# Patient Record
Sex: MALE | Race: OTHER | NOT HISPANIC OR LATINO | Employment: FULL TIME | URBAN - METROPOLITAN AREA
[De-identification: names, ages, dates, MRNs, and addresses within clinical notes are randomized per-mention and may not be internally consistent; named-entity substitution may affect disease eponyms.]

---

## 2023-06-23 ENCOUNTER — OFFICE VISIT (OUTPATIENT)
Dept: UROLOGY | Facility: CLINIC | Age: 48
End: 2023-06-23
Payer: MEDICAID

## 2023-06-23 DIAGNOSIS — R31.0 GROSS HEMATURIA: Primary | ICD-10-CM

## 2023-06-23 DIAGNOSIS — R82.90 ABNORMAL URINALYSIS: ICD-10-CM

## 2023-06-23 LAB
BILIRUBIN, UA POC OHS: NEGATIVE
BLOOD, UA POC OHS: ABNORMAL
CLARITY, UA POC OHS: CLEAR
COLOR, UA POC OHS: YELLOW
GLUCOSE, UA POC OHS: NEGATIVE
KETONES, UA POC OHS: NEGATIVE
LEUKOCYTES, UA POC OHS: ABNORMAL
NITRITE, UA POC OHS: NEGATIVE
PH, UA POC OHS: 6
PROTEIN, UA POC OHS: NEGATIVE
SPECIFIC GRAVITY, UA POC OHS: >=1.03
UROBILINOGEN, UA POC OHS: 0.2

## 2023-06-23 PROCEDURE — 81003 URINALYSIS AUTO W/O SCOPE: CPT | Mod: QW,S$GLB,, | Performed by: UROLOGY

## 2023-06-23 PROCEDURE — 99204 PR OFFICE/OUTPT VISIT, NEW, LEVL IV, 45-59 MIN: ICD-10-PCS | Mod: S$GLB,,, | Performed by: UROLOGY

## 2023-06-23 PROCEDURE — 81003 POCT URINALYSIS(INSTRUMENT): ICD-10-PCS | Mod: QW,S$GLB,, | Performed by: UROLOGY

## 2023-06-23 PROCEDURE — 99204 OFFICE O/P NEW MOD 45 MIN: CPT | Mod: S$GLB,,, | Performed by: UROLOGY

## 2023-06-23 RX ORDER — CEFDINIR 300 MG/1
300 CAPSULE ORAL 2 TIMES DAILY
Qty: 14 CAPSULE | Refills: 0 | Status: SHIPPED | OUTPATIENT
Start: 2023-06-23 | End: 2023-06-30

## 2023-06-23 NOTE — PROGRESS NOTES
Subjective:       Patient ID: Eduardo Jesus is a 47 y.o. male.    Chief Complaint: Testicle Pain and Hematuria      HPI: 47-year-old male patient presenting to the clinic to establish care following a recent hospital visit for testicular pain and hematuria.  Patient no longer complaining of pain in the testicles.  He had a CT done on 06/19/2023 with no abnormal findings.  Patient complains of gross hematuria that began about 2 weeks ago.  Patient is a smoker.  At his ER visit physician also mentioned an elevated PSA but the patient is unsure about the actual number.  He does have a family history of prostate cancer.  He has no other complaint at this time       Past Medical History: History reviewed. No pertinent past medical history.    Past Surgical Historical:   Past Surgical History:   Procedure Laterality Date    left should surgery          Medications:   Medication List with Changes/Refills   New Medications    CEFDINIR (OMNICEF) 300 MG CAPSULE    Take 1 capsule (300 mg total) by mouth 2 (two) times daily. for 7 days        Past Social History:   Social History     Socioeconomic History    Marital status:    Tobacco Use    Smoking status: Some Days     Types: Cigarettes    Smokeless tobacco: Never   Substance and Sexual Activity    Alcohol use: Never    Drug use: Never    Sexual activity: Yes       Allergies: Review of patient's allergies indicates:  No Known Allergies     Family History: History reviewed. No pertinent family history.     Review of Systems:  Review of Systems   HENT: Negative.     Respiratory: Negative.       Physical Exam:  Physical Exam  Constitutional:       Appearance: He is normal weight.   HENT:      Head: Normocephalic.      Nose: Nose normal.      Mouth/Throat:      Mouth: Mucous membranes are moist.      Pharynx: Oropharynx is clear.   Eyes:      Extraocular Movements: Extraocular movements intact.      Conjunctiva/sclera: Conjunctivae normal.      Pupils: Pupils are equal,  round, and reactive to light.   Cardiovascular:      Rate and Rhythm: Regular rhythm.      Pulses: Normal pulses.      Heart sounds: Normal heart sounds.   Pulmonary:      Effort: Pulmonary effort is normal.      Breath sounds: Normal breath sounds.   Abdominal:      General: Abdomen is flat. Bowel sounds are normal.      Palpations: Abdomen is soft.      Hernia: There is no hernia in the right inguinal area or left inguinal area.   Genitourinary:     Penis: Normal. No phimosis, paraphimosis, hypospadias, erythema, tenderness or discharge.       Testes: Normal.         Right: Mass, tenderness or swelling not present. Right testis is descended. Cremasteric reflex is present.          Left: Mass, tenderness or swelling not present. Left testis is descended. Cremasteric reflex is present.       Prostate: Normal.      Rectum: Normal.   Musculoskeletal:         General: Normal range of motion.      Cervical back: Normal range of motion and neck supple.   Lymphadenopathy:      Lower Body: No right inguinal adenopathy. No left inguinal adenopathy.   Skin:     General: Skin is warm and dry.      Capillary Refill: Capillary refill takes less than 2 seconds.   Neurological:      General: No focal deficit present.      Mental Status: He is alert and oriented to person, place, and time. Mental status is at baseline.   Psychiatric:         Mood and Affect: Mood normal.         Behavior: Behavior normal.         Thought Content: Thought content normal.         Judgment: Judgment normal.       Assessment/Plan:       Patient was seen at the ER around the 19th of this month for lower back and testicular pain.  Since that visit the patient has not been hurting.  Patient does state that he was experiencing gross hematuria.  Urinalysis suspicious of infection.  We will culture his urine and start him on a week of Omnicef.  Patient did have a CT done at [facility] with no abnormal findings.  PVR today was 38 mL.  No urinary  complaints at this visit.    Gross hematuria: ct done with no abnormal findings.  We will set the patient up for cystoscopy to complete the workup.  Patient is a smoker.    Educated patient about reasons for elevated PSA and reassured him that elevation is likely with infection. We will treat him with omnicef and recheck PSA at his cystoscopy appointment.  Patient has family history of prostate cancer.    I, Dr. Aron Fernandez have seen and personally evaluated the patient. I have formulated the plan reviewed all pertinent imaging and clinical data.  I agree with the nurse practitioner's assessment, and I have personally formulated the plan for this patient's care as described by the midlevel.  Problem List Items Addressed This Visit    None  Visit Diagnoses       Gross hematuria    -  Primary    Relevant Orders    Cystoscopy    Abnormal urinalysis        Relevant Medications    cefdinir (OMNICEF) 300 MG capsule    Other Relevant Orders    Urine culture

## 2023-06-25 LAB — URINE CULTURE, ROUTINE: NORMAL

## 2023-06-26 ENCOUNTER — TELEPHONE (OUTPATIENT)
Dept: UROLOGY | Facility: CLINIC | Age: 48
End: 2023-06-26
Payer: MEDICAID

## 2023-06-26 NOTE — TELEPHONE ENCOUNTER
Attempted to contact pt regarding urine culture. Left VM .     ----- Message from Denia Cannon NP sent at 6/26/2023  8:22 AM CDT -----  Please notify patient there is no growth in his urine culture.  No need for additional antibiotic at this time.  Please instruct patient to complete Omnicef that was prescribed at his visit.

## 2023-06-29 ENCOUNTER — TELEPHONE (OUTPATIENT)
Dept: UROLOGY | Facility: CLINIC | Age: 48
End: 2023-06-29
Payer: MEDICAID

## 2023-06-29 NOTE — TELEPHONE ENCOUNTER
----- Message from Emperatriz Garber sent at 6/29/2023 12:24 PM CDT -----  Contact: Delia (wife)  Delia called to consult with nurse or staff regarding a missed call. She states the call was regarding the patients results. Delia would like a call back and can be reached at [phone]. Thanks/MR

## 2023-07-24 ENCOUNTER — TELEPHONE (OUTPATIENT)
Dept: UROLOGY | Facility: CLINIC | Age: 48
End: 2023-07-24
Payer: MEDICAID

## 2023-07-24 NOTE — TELEPHONE ENCOUNTER
No answer, left message for arrival time of 3:10 PM. Instructed to call with any questions or concerns.

## 2023-07-25 DIAGNOSIS — N50.819 PAIN IN TESTICLE, UNSPECIFIED LATERALITY: Primary | ICD-10-CM

## 2023-07-25 RX ORDER — CEFDINIR 300 MG/1
300 CAPSULE ORAL 2 TIMES DAILY
Qty: 14 CAPSULE | Refills: 0 | Status: SHIPPED | OUTPATIENT
Start: 2023-07-25 | End: 2023-08-01